# Patient Record
Sex: MALE | Race: WHITE
[De-identification: names, ages, dates, MRNs, and addresses within clinical notes are randomized per-mention and may not be internally consistent; named-entity substitution may affect disease eponyms.]

---

## 2018-05-11 ENCOUNTER — HOSPITAL ENCOUNTER (EMERGENCY)
Dept: HOSPITAL 76 - ED | Age: 23
Discharge: HOME | End: 2018-05-11
Payer: COMMERCIAL

## 2018-05-11 VITALS — SYSTOLIC BLOOD PRESSURE: 105 MMHG | DIASTOLIC BLOOD PRESSURE: 65 MMHG

## 2018-05-11 DIAGNOSIS — S63.634A: ICD-10-CM

## 2018-05-11 DIAGNOSIS — F17.200: Primary | ICD-10-CM
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
## 2018-05-11 PROCEDURE — 99283 EMERGENCY DEPT VISIT LOW MDM: CPT

## 2018-05-11 PROCEDURE — 99282 EMERGENCY DEPT VISIT SF MDM: CPT

## 2018-05-11 PROCEDURE — 73140 X-RAY EXAM OF FINGER(S): CPT

## 2018-05-11 PROCEDURE — 1040M: CPT

## 2018-05-11 NOTE — ED PHYSICIAN DOCUMENTATION
History of Present Illness





- Stated complaint


Stated Complaint: L FINGER PX





- Chief complaint


Chief Complaint: Ext Problem





- Additonal information


Additional information: 





hx from pt


heavy fish vs finger


does not know if crush type injury or bent back in any case pain to prox 

phalange and PIP





Review of Systems


Musculoskeletal: reports: Extremity pain





PD PAST MEDICAL HISTORY





- Past Medical History


Past Medical History: No





- Past Surgical History


Past Surgical History: No





- Present Medications


Home Medications: 


 Ambulatory Orders











 Medication  Instructions  Recorded  Confirmed


 


No Known Home Medications [No  05/11/18 05/11/18





Known Home Medications]   














- Allergies


Allergies/Adverse Reactions: 


 Allergies











Allergy/AdvReac Type Severity Reaction Status Date / Time


 


No Known Drug Allergies Allergy   Verified 05/11/18 18:27














- Social History


Does the pt smoke?: Yes


Smoking Status: Current every day smoker


Does the pt drink ETOH?: Yes


Does the pt have substance abuse?: No





- Immunizations


Immunizations are current?: Yes


Immunizations: TDAP >10years/unknown





PD ED PE NORMAL





- Vitals


Vital signs reviewed: Yes





- Extremities


Extremities: Other (L 5th finger with abrasion and bruise and TTP primarily to 

prox phalanx and PIP, MSV intact)





Results





- Vitals


Vitals: 





 Vital Signs - 24 hr











  05/11/18





  18:26


 


Temperature 37.9 C H


 


Heart Rate 95


 


Respiratory 16





Rate 


 


Blood Pressure 137/79 H


 


O2 Saturation 98














- Rads (name of study)


  ** finger


Radiology: See rad report (neg)





Departure





- Departure


Disposition: 01 Home, Self Care


Clinical Impression: 


Sprain, finger


Qualifiers:


 Encounter type: initial encounter Finger: ring finger Sprain of finger site: 

interphalangeal joint Laterality: right Qualified Code(s): S63.634A - Sprain of 

interphalangeal joint of right ring finger, initial encounter





Condition: Good


Instructions:  ED Sprain Finger


Comments: 


The xray looks fine - no fracture


Wear the splint, apply ice for twenty minutes at a time, motrin as needed for 

pain





Forms:  Activity restrictions

## 2018-05-11 NOTE — XRAY REPORT
EXAM:

LEFT FIFTH DIGIT RADIOGRAPHY 

 

EXAM DATE: 5/11/2018 06:54 PM.

 

CLINICAL HISTORY: Trauma.

 

COMPARISON: None.

 

TECHNIQUE: 3 views.

 

FINDINGS: 

Bones: Normal. No fracture or bone lesion.

 

Joints: Normal. No subluxations.

 

Soft Tissues: There is soft tissue swelling.

 

IMPRESSION: No fracture or subluxation.

 

[facility] 

Referring Provider Line: [phone]

 

SITE ID: [id number]

## 2021-06-18 ENCOUNTER — HOSPITAL ENCOUNTER (EMERGENCY)
Age: 26
Discharge: HOME | End: 2021-06-18
Payer: COMMERCIAL

## 2021-06-18 VITALS
TEMPERATURE: 97.52 F | OXYGEN SATURATION: 98 % | SYSTOLIC BLOOD PRESSURE: 120 MMHG | HEART RATE: 84 BPM | DIASTOLIC BLOOD PRESSURE: 80 MMHG | RESPIRATION RATE: 19 BRPM

## 2021-06-18 VITALS
RESPIRATION RATE: 16 BRPM | OXYGEN SATURATION: 100 % | HEART RATE: 79 BPM | DIASTOLIC BLOOD PRESSURE: 73 MMHG | SYSTOLIC BLOOD PRESSURE: 119 MMHG

## 2021-06-18 VITALS — BODY MASS INDEX: 31.4 KG/M2

## 2021-06-18 DIAGNOSIS — L03.031: ICD-10-CM

## 2021-06-18 DIAGNOSIS — L03.032: Primary | ICD-10-CM

## 2021-06-18 DIAGNOSIS — B35.3: ICD-10-CM

## 2021-06-18 PROCEDURE — 99282 EMERGENCY DEPT VISIT SF MDM: CPT

## 2021-06-18 PROCEDURE — 82962 GLUCOSE BLOOD TEST: CPT
